# Patient Record
Sex: FEMALE | Race: WHITE | NOT HISPANIC OR LATINO
[De-identification: names, ages, dates, MRNs, and addresses within clinical notes are randomized per-mention and may not be internally consistent; named-entity substitution may affect disease eponyms.]

---

## 2018-05-17 ENCOUNTER — APPOINTMENT (OUTPATIENT)
Dept: ORTHOPEDIC SURGERY | Facility: CLINIC | Age: 48
End: 2018-05-17

## 2018-06-29 ENCOUNTER — APPOINTMENT (OUTPATIENT)
Dept: ORTHOPEDIC SURGERY | Facility: CLINIC | Age: 48
End: 2018-06-29
Payer: MEDICAID

## 2018-06-29 VITALS
HEART RATE: 93 BPM | BODY MASS INDEX: 20.41 KG/M2 | HEIGHT: 66 IN | WEIGHT: 127 LBS | DIASTOLIC BLOOD PRESSURE: 71 MMHG | SYSTOLIC BLOOD PRESSURE: 105 MMHG

## 2018-06-29 DIAGNOSIS — M22.41 CHONDROMALACIA PATELLAE, RIGHT KNEE: ICD-10-CM

## 2018-06-29 DIAGNOSIS — M25.561 PAIN IN RIGHT KNEE: ICD-10-CM

## 2018-06-29 PROCEDURE — 99213 OFFICE O/P EST LOW 20 MIN: CPT

## 2018-06-29 RX ORDER — TEMAZEPAM 30 MG/1
30 CAPSULE ORAL
Qty: 30 | Refills: 0 | Status: ACTIVE | COMMUNITY
Start: 2018-01-27

## 2018-06-29 RX ORDER — PREDNISOLONE ACETATE 10 MG/ML
1 SUSPENSION/ DROPS OPHTHALMIC
Qty: 5 | Refills: 0 | Status: ACTIVE | COMMUNITY
Start: 2018-01-31

## 2018-06-29 RX ORDER — HALOBETASOL PROPIONATE 0.5 MG/G
0.05 CREAM TOPICAL
Qty: 50 | Refills: 0 | Status: ACTIVE | COMMUNITY
Start: 2018-05-24

## 2018-06-29 RX ORDER — CIPROFLOXACIN HYDROCHLORIDE 500 MG/1
500 TABLET, FILM COATED ORAL
Qty: 10 | Refills: 0 | Status: ACTIVE | COMMUNITY
Start: 2018-06-02

## 2018-06-29 RX ORDER — MOMETASONE FUROATE 1 MG/G
0.1 CREAM TOPICAL
Qty: 45 | Refills: 0 | Status: ACTIVE | COMMUNITY
Start: 2018-05-24

## 2018-06-29 RX ORDER — VALACYCLOVIR 1 G/1
1 TABLET, FILM COATED ORAL
Qty: 8 | Refills: 0 | Status: ACTIVE | COMMUNITY
Start: 2018-04-11

## 2018-07-09 ENCOUNTER — FORM ENCOUNTER (OUTPATIENT)
Age: 48
End: 2018-07-09

## 2018-07-10 ENCOUNTER — APPOINTMENT (OUTPATIENT)
Dept: MRI IMAGING | Facility: CLINIC | Age: 48
End: 2018-07-10
Payer: MEDICAID

## 2018-07-10 ENCOUNTER — OUTPATIENT (OUTPATIENT)
Dept: OUTPATIENT SERVICES | Facility: HOSPITAL | Age: 48
LOS: 1 days | End: 2018-07-10
Payer: MEDICAID

## 2018-07-10 DIAGNOSIS — M25.561 PAIN IN RIGHT KNEE: ICD-10-CM

## 2018-07-10 PROCEDURE — 73721 MRI JNT OF LWR EXTRE W/O DYE: CPT

## 2018-07-10 PROCEDURE — 73721 MRI JNT OF LWR EXTRE W/O DYE: CPT | Mod: 26,RT

## 2018-10-18 ENCOUNTER — APPOINTMENT (OUTPATIENT)
Dept: GASTROENTEROLOGY | Facility: CLINIC | Age: 48
End: 2018-10-18
Payer: MEDICAID

## 2018-10-18 ENCOUNTER — LABORATORY RESULT (OUTPATIENT)
Age: 48
End: 2018-10-18

## 2018-10-18 VITALS
WEIGHT: 126 LBS | SYSTOLIC BLOOD PRESSURE: 110 MMHG | HEART RATE: 68 BPM | BODY MASS INDEX: 20.25 KG/M2 | HEIGHT: 66 IN | TEMPERATURE: 98.6 F | DIASTOLIC BLOOD PRESSURE: 70 MMHG | OXYGEN SATURATION: 99 %

## 2018-10-18 DIAGNOSIS — K21.9 GASTRO-ESOPHAGEAL REFLUX DISEASE W/OUT ESOPHAGITIS: ICD-10-CM

## 2018-10-18 DIAGNOSIS — R14.0 ABDOMINAL DISTENSION (GASEOUS): ICD-10-CM

## 2018-10-18 PROCEDURE — 99204 OFFICE O/P NEW MOD 45 MIN: CPT

## 2018-10-18 PROCEDURE — 83014 H PYLORI DRUG ADMIN: CPT

## 2018-10-24 DIAGNOSIS — Z86.19 PERSONAL HISTORY OF OTHER INFECTIOUS AND PARASITIC DISEASES: ICD-10-CM

## 2018-10-25 RX ORDER — LANSOPRAZOLE, AMOXICILLIN, CLARITHROMYCIN 30-500-500
KIT ORAL
Qty: 1 | Refills: 0 | Status: ACTIVE | COMMUNITY
Start: 2018-10-25 | End: 1900-01-01

## 2018-11-01 PROBLEM — Z86.19 H/O HELICOBACTER INFECTION: Status: ACTIVE | Noted: 2018-10-18

## 2018-11-01 RX ORDER — CLARITHROMYCIN 500 MG/1
500 TABLET, FILM COATED ORAL
Qty: 20 | Refills: 0 | Status: ACTIVE | COMMUNITY
Start: 2018-11-01 | End: 1900-01-01

## 2018-11-01 RX ORDER — PANTOPRAZOLE 40 MG/1
40 TABLET, DELAYED RELEASE ORAL TWICE DAILY
Qty: 20 | Refills: 2 | Status: ACTIVE | COMMUNITY
Start: 2018-11-01 | End: 1900-01-01

## 2018-11-01 RX ORDER — AMOXICILLIN 500 MG/1
500 TABLET, FILM COATED ORAL 3 TIMES DAILY
Qty: 30 | Refills: 0 | Status: ACTIVE | COMMUNITY
Start: 2018-11-01 | End: 1900-01-01

## 2019-01-11 ENCOUNTER — APPOINTMENT (OUTPATIENT)
Dept: ORTHOPEDIC SURGERY | Facility: CLINIC | Age: 49
End: 2019-01-11
Payer: MEDICAID

## 2019-01-11 VITALS
DIASTOLIC BLOOD PRESSURE: 74 MMHG | HEART RATE: 78 BPM | SYSTOLIC BLOOD PRESSURE: 112 MMHG | HEIGHT: 66 IN | WEIGHT: 127 LBS | BODY MASS INDEX: 20.41 KG/M2

## 2019-01-11 PROCEDURE — 99213 OFFICE O/P EST LOW 20 MIN: CPT

## 2019-01-11 NOTE — PHYSICAL EXAM
[LE] : Sensory: Intact in bilateral lower extremities [DP] : dorsalis pedis 2+ and symmetric bilaterally [FreeTextEntry2] : 48-year-old female. Normal gait pattern. Equal leg length. Skin intact both lower extremities. Neutral alignment of both knees.   0-120° flexion both knees. Midline patella tracking. Crepitus noted. Pain right knee with patella compression. Right knee is stable to varus/valgus and Lachman exam testing. No calf tenderness. 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 \par \par Mild degenerative undersurface fraying at the posterior body medial meniscus. \par \par 1 cm superficial cartilage loss with chondral fibrillation at the posterior \par weightbearing medial femoral condyle. \par \par Small knee joint effusion with synovitis and small multiloculated popliteal \par cyst.

## 2019-01-11 NOTE — DISCUSSION/SUMMARY
[de-identified] : Discussion had with the patient. Findings as noted above. Recommend strengthening right knee and lower extremity. Focus on quad strengthening. Activity precautions reviewed. Over-the-counter analgesics as needed. Follow up as needed.

## 2019-03-14 ENCOUNTER — APPOINTMENT (OUTPATIENT)
Dept: ORTHOPEDIC SURGERY | Facility: CLINIC | Age: 49
End: 2019-03-14
Payer: MEDICAID

## 2019-03-14 VITALS
SYSTOLIC BLOOD PRESSURE: 118 MMHG | HEART RATE: 82 BPM | HEIGHT: 66 IN | BODY MASS INDEX: 20.09 KG/M2 | DIASTOLIC BLOOD PRESSURE: 71 MMHG | WEIGHT: 125 LBS

## 2019-03-14 DIAGNOSIS — M25.561 PAIN IN RIGHT KNEE: ICD-10-CM

## 2019-03-14 PROCEDURE — 99214 OFFICE O/P EST MOD 30 MIN: CPT

## 2019-03-14 RX ORDER — LANSOPRAZOLE 30 MG/1
30 CAPSULE, DELAYED RELEASE ORAL
Qty: 28 | Refills: 0 | Status: ACTIVE | COMMUNITY
Start: 2018-10-31

## 2019-03-14 RX ORDER — AMOXICILLIN 500 MG/1
500 CAPSULE ORAL
Qty: 56 | Refills: 0 | Status: ACTIVE | COMMUNITY
Start: 2018-10-31

## 2019-03-14 RX ORDER — SULFAMETHOXAZOLE AND TRIMETHOPRIM 800; 160 MG/1; MG/1
800-160 TABLET ORAL
Qty: 10 | Refills: 0 | Status: ACTIVE | COMMUNITY
Start: 2018-10-01

## 2019-03-14 RX ORDER — MOMETASONE FUROATE 1 MG/ML
0.1 SOLUTION TOPICAL
Qty: 60 | Refills: 0 | Status: ACTIVE | COMMUNITY
Start: 2018-09-22

## 2019-03-14 RX ORDER — HYALURONATE SODIUM 20 MG/2 ML
20 SYRINGE (ML) INTRAARTICULAR
Qty: 1 | Refills: 0 | Status: ACTIVE | OUTPATIENT
Start: 2019-03-14

## 2019-03-14 RX ORDER — FLUCONAZOLE 150 MG/1
150 TABLET ORAL
Qty: 1 | Refills: 0 | Status: ACTIVE | COMMUNITY
Start: 2018-10-01

## 2019-03-14 RX ORDER — HYDROCODONE BITARTRATE AND HOMATROPINE METHYLBROMIDE 5; 1.5 MG/5ML; MG/5ML
5-1.5 SYRUP ORAL
Qty: 60 | Refills: 0 | Status: ACTIVE | COMMUNITY
Start: 2018-09-20

## 2019-03-17 NOTE — DISCUSSION/SUMMARY
[de-identified] : Patient was seen today for continued management of chronic right knee pain. She was previously evaluated by one of my surgical associates, we reviewed her MRI and prior history, based on prior history she is not have any surgical indications. She has tried several courses of conservative management including activity modification, rest, oral NSAIDs, and physical therapy. Patient has very good function, but still does have intermittent pain with ADLs, particularly pain with going upstairs. She still feels limited in her ability to exercise due to her knee pain. At this time recommend a trial of hyaluronic acid as a more long-term preventative measure to see if this will help decrease the friction on the cartilage wear in the patellofemoral compartment. She has good relief, she may resume other exercise and activities as tolerated. We'll try to obtain insurance authorization for this injection therapy and patient will follow up once we have approval.  Patient appreciates and agrees with current plan.\par \par This note was generated using dragon medical dictation software.  A reasonable effort has been made for proofreading its contents, but typos may still remain.  If there are any questions or points of clarification needed please notify my office.\par Recommendation: Trial of Viscosupplementation. Will obtain preauthorization prior to injection therapy.\par \par Followup: Once approved for injection therapy.\par \par \par **NB: Medication was Issued under circumstances where the provider (Dr. Jaime Moyer) reasonably determined that it would be impractical for the patient to obtain substances prescribed by electronic prescription (e-prescribe) given need for pre-authorization, and such delay would adversely impact the patient's medical condition. An exception letter will be mailed to the [facility] during the authorization process.**\par

## 2019-03-17 NOTE — HISTORY OF PRESENT ILLNESS
[de-identified] : Patient is here today for evaluation of one year of right knee pain. She states she had a trip and fall on pavement where she landed on the anterior knee at that time. Patient has been seen by one of my associates and has had extensive conservative management. Patient has tried activity modification, occasional use of oral NSAIDs, as well as physical therapy. Patient continues have pain with going up stairs, pain is primarily located behind the kneecap. Patient had an MRI which was previously reviewed by my associate, she has RGoyo Jonathan degradation of the medial femoral condyle, and patellofemoral arthrosis. She was determined to not be a surgical candidate and recommended to continue with conservative nonsurgical treatments. Patient is here today for evaluation and another opinion on other treatment options that may be available to her. No numbness/tingling, no radiation of pain, no current medications for this issue.

## 2019-03-17 NOTE — PHYSICAL EXAM
[de-identified] : Constitutional: Well-nourished, well-developed, No acute distress\par Respiratory:  Good respiratory effort, no SOB\par Lymphatic: No regional lymphadenopathy, no lymphedema\par Psychiatric: Pleasant and normal affect, alert and oriented x3\par Skin: Clean dry and intact B/L UE/LE\par Musculoskeletal: normal except where as noted in regional exam\par \par B/L Hips: No asymmetry, malalignment, or swelling, Full ROM, 5/5 strength in flexion/ext, IR/ER, Abd/Add, Joints stable\par B/L Ankles: No asymmetry, malalignment, or swelling, Full ROM, 5/5 strength in DF/PF/Inv/Ev, Joints stable\par \par BIOMECHANICAL EXAM: no marked leg length discrepancy, moderate hip abductor weakness b/l, no marked pes planus or foot pronation, tight hams and ITB b/l.  Normal gait and station\par \par Left Knee:\par APPEARANCE: no marked deformities, no swelling or malalignment\par POSITIVE TENDERNESS:  + crepitus of the anterior knee, and tenderness of patellar retinaculum\par NONTENDER: jt lines b/l, patellar & quadriceps tendons, MCL/LCL, ITB at the lateral femoral condyle & Gerdy's tubercle, pes bursa. \par ROM: full & painless, although some discomfort in deep knee flexion\par RESISTIVE TESTING: + discomfort with knee ext from deep knee flexion (stretched position), painless knee flexion. \par SPECIAL TESTS: stable v/v stress. painless grind. neg Lachman's. neg ant/post drawer. neg Jennifer's. neg Thessaly test. neg Darryl's & Malacrae's\par NEURO: Normal sensation of LE, DTRs 2+/4 patella and achilles\par PULSES: 2+ DP/PT pulses\par \par Right Knee:\par APPEARANCE: no marked deformities, no swelling or malalignment\par POSITIVE TENDERNESS:  + crepitus of the anterior knee, and tenderness of patellar retinaculum\par NONTENDER: jt lines b/l, patellar & quadriceps tendons, MCL/LCL, ITB at the lateral femoral condyle & Gerdy's tubercle, pes bursa. \par ROM: full & painless, although some discomfort in deep knee flexion\par RESISTIVE TESTING: + discomfort with knee ext from deep knee flexion (stretched position), painless knee flexion. \par SPECIAL TESTS: stable v/v stress. painless grind. neg Lachman's. neg ant/post drawer. neg Jennifer's. neg Thessaly test. 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 \par \par Mild degenerative undersurface fraying at the posterior body medial meniscus. \par \par 1 cm superficial cartilage loss with chondral fibrillation at the posterior \par weightbearing medial femoral condyle. \par \par Small knee joint effusion with synovitis and small multiloculated popliteal \par cyst. \par

## 2019-04-11 ENCOUNTER — APPOINTMENT (OUTPATIENT)
Dept: ORTHOPEDIC SURGERY | Facility: CLINIC | Age: 49
End: 2019-04-11
Payer: MEDICAID

## 2019-04-11 PROCEDURE — 99213 OFFICE O/P EST LOW 20 MIN: CPT

## 2019-04-11 NOTE — PHYSICAL EXAM
[de-identified] : Constitutional: Well-nourished, well-developed, No acute distress\par Respiratory:  Good respiratory effort, no SOB\par Lymphatic: No regional lymphadenopathy, no lymphedema\par Psychiatric: Pleasant and normal affect, alert and oriented x3\par Skin: Clean dry and intact B/L UE/LE\par Musculoskeletal: normal except where as noted in regional exam\par \par Right Knee:\par APPEARANCE: no marked deformities, no swelling or malalignment\par POSITIVE TENDERNESS:  + crepitus of the anterior knee, and tenderness of patellar retinaculum\par NONTENDER: jt lines b/l, patellar & quadriceps tendons, MCL/LCL, ITB at the lateral femoral condyle & Gerdy's tubercle, pes bursa. \par ROM: full & painless, although some discomfort in deep knee flexion\par RESISTIVE TESTING: + discomfort with knee ext from deep knee flexion (stretched position), painless knee flexion. \par NEURO: Normal sensation of LE, DTRs 2+/4 patella and achilles\par PULSES: 2+ DP/PT pulses\par

## 2019-04-11 NOTE — DISCUSSION/SUMMARY
[de-identified] : Patient was seen today for followup of right knee pain, we tried to obtain insurance authorization for hyaluronic acid injections, however this was denied. Patient states she does not wish to undergo cortisone injection, which I agree that she is not have severe acute pain, she would benefit more from a long-term preventative measure. Patient states that she has a  as her right knee pain is secondary to a fall. She states her  revised her to obtain a letter stating my recommendations for hyaluronic acid, and he feels that she would be able to have insurance pay for this injection therapy. If she can obtain payment and/or authorization, I am happy to administer this injection therapy as I feel is recommended to help with her patellofemoral pain. She is to continue her course of physical therapy and activity modification as previously discussed.  Patient appreciates and agrees with current plan.\par \par This note was generated using dragon medical dictation software.  A reasonable effort has been made for proofreading its contents, but typos may still remain.  If there are any questions or points of clarification needed please notify my office.

## 2019-04-11 NOTE — RETURN TO WORK/SCHOOL
[FreeTextEntry1] : Summer is under my care for right knee pain secondary to patellofemoral chondrosis/arthritis. We have discussed various treatment options and she is recommended to undergo a course of hyaluronic acid injection therapy, if this provides good relief she would be a candidate for this injection therapy every 6-12 months for long-term preventative care.  We have tried to obtain insurance authorization for this injection therapy from her primary insurance, but it was denied.\par Thank you for your understanding.\par \par Sincerely,\par \par Jaime Moyer DO, ATC\par Primary Care Sports Medicine\par [facility] [place]\par

## 2019-04-24 ENCOUNTER — APPOINTMENT (OUTPATIENT)
Dept: ORTHOPEDIC SURGERY | Facility: CLINIC | Age: 49
End: 2019-04-24
Payer: MEDICAID

## 2019-04-24 VITALS
HEART RATE: 88 BPM | BODY MASS INDEX: 20.09 KG/M2 | SYSTOLIC BLOOD PRESSURE: 99 MMHG | HEIGHT: 66 IN | DIASTOLIC BLOOD PRESSURE: 63 MMHG | WEIGHT: 125 LBS

## 2019-04-24 DIAGNOSIS — M17.11 UNILATERAL PRIMARY OSTEOARTHRITIS, RIGHT KNEE: ICD-10-CM

## 2019-04-24 PROCEDURE — 99213 OFFICE O/P EST LOW 20 MIN: CPT

## 2019-04-24 NOTE — DISCUSSION/SUMMARY
[de-identified] : Patient was seen today for continued management of right knee pain. There has been no interval change in her condition since last evaluation, if anything her condition has worsened, she is not using crutches as ambulatory assistive device. Patient is not currently doing any physical therapy, she is not performing a home exercise program at home. This is merely sitting most of the day and limping with pain when ambulating. Patient last had MRI of the right knee performed in July 2018, she is requesting a repeat MRI to see if there has been any interval change since that time. We'll try to obtain insurance authorization for this study, however she is unlikely to have any surgical indications as there've been no acute injuries since last evaluation. Patient was still benefit from physical therapy, cortisone injection, and/or hyaluronic acid injection therapy to alleviate her chronic knee pain. Patient will follow up on MRI results are available.  Patient appreciates and agrees with current plan.\par \par This note was generated using dragon medical dictation software.  A reasonable effort has been made for proofreading its contents, but typos may still remain.  If there are any questions or points of clarification needed please notify my office.

## 2019-04-24 NOTE — HISTORY OF PRESENT ILLNESS
[de-identified] : Patient is here for right knee pain follow up. She states that since her last evaluation her pain has increased without further injury. She has been wearing a knee compression sleeve and ambulating with crutches. She contacted her  in terms of gaining approval for the gel injections but she has not heard back at this time. She is requesting a follow up MRI.

## 2019-04-24 NOTE — PHYSICAL EXAM
[de-identified] : Constitutional: Well-nourished, well-developed, No acute distress\par Respiratory:  Good respiratory effort, no SOB\par Lymphatic: No regional lymphadenopathy, no lymphedema\par Psychiatric: Pleasant and normal affect, alert and oriented x3\par Skin: Clean dry and intact B/L UE/LE\par Musculoskeletal: normal except where as noted in regional exam\par \par Right Knee:\par APPEARANCE: 1+ swelling, no marked deformities or malalignment\par POSITIVE TENDERNESS:  + crepitus of the anterior knee, and tenderness of patellar retinaculum\par NONTENDER: jt lines b/l, patellar & quadriceps tendons, MCL/LCL, ITB at the lateral femoral condyle & Gerdy's tubercle, pes bursa. \par ROM: full & painless, although some discomfort in deep knee flexion\par RESISTIVE TESTING: + discomfort with knee ext from deep knee flexion (stretched position), painless knee flexion. \par NEURO: Normal sensation of LE, DTRs 2+/4 patella and achilles\par PULSES: 2+ DP/PT pulses\par

## 2019-04-24 NOTE — RETURN TO WORK/SCHOOL
[FreeTextEntry1] : Summer is under my care for right knee osteoarthritis, she is currently having a significant exacerbation of her pain and utilizing crutches for ambulation. She has pain with any prolonged standing or sitting. 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

## 2020-08-20 ENCOUNTER — APPOINTMENT (OUTPATIENT)
Dept: ORTHOPEDIC SURGERY | Facility: CLINIC | Age: 50
End: 2020-08-20
Payer: MEDICAID

## 2020-08-20 VITALS — TEMPERATURE: 97.9 F

## 2020-08-20 DIAGNOSIS — M18.12 UNILATERAL PRIMARY OSTEOARTHRITIS OF FIRST CARPOMETACARPAL JOINT, LEFT HAND: ICD-10-CM

## 2020-08-20 PROCEDURE — 99214 OFFICE O/P EST MOD 30 MIN: CPT

## 2020-08-20 NOTE — HISTORY OF PRESENT ILLNESS
[de-identified] : Pt is a 51 yo F here for eval of Left thumb pain which started when she had a fall in January 2018.  Patient had no evaluation at that time, there is been no evaluation or treatment for this thumb pain since that time.  Patient has noticed increased pain over the last 2-3 months, she also notes weakness in the thumb, denies numbness/tingling.  T She has used topical medications with no help.  Not taking pain meds.  She has not used a brace.

## 2020-08-20 NOTE — PHYSICAL EXAM
[de-identified] : Constitutional: Well-nourished, well-developed, No acute distress\par Respiratory:  Good respiratory effort, no SOB\par Lymphatic: No regional lymphadenopathy, no lymphedema\par Psychiatric: Pleasant and normal affect, alert and oriented x3\par Skin: Clean dry and intact B/L UE/LE\par Musculoskeletal: normal except where as noted in regional exam\par \par \par Right Hand:\par APPEARANCE: no marked deformities, no swelling or malalignment\par POSITIVE TENDERNESS: none\par NONTENDER: osseous and ligamentous structures. \par ROM: full & painless in CMC, MCP, and IP joints. \par RESISTIVE TESTING: painless resisted testing. \par SPECIAL TESTS: normal sensation of 1st through 5th digits, normal flex/ext, abd/add, and opposition of thumb, no triggering of fingers\par Vasc: 2+ radial pulse, normal capillary refill\par Neuro: AIN, PIN, Ulnar nerve intact to motor\par Sensation: Intact to light touch throughout\par B/L Shoulders:  No asymmetry, malalignment, or swelling, Full ROM, 5/5 strength in flexion/ext, Abd/Add, IR/ER, Joints stable\par B/L Elbows:  No asymmetry, malalignment, or swelling, Full ROM, 5/5 strength in flex/ext, pronation/supination, Joints stable\par B/L wrists: No asymmetry, malalignment, or swelling, Full ROM, 5/5 strength in wrist flexion/ext, and radial/ulnar deviation, Joints stable\par \par Left Hand:\par APPEARANCE: No swelling, no marked deformities or malalignment of the fingers\par POSITIVE TENDERNESS: + 1st CMC joint\par NONTENDER: all other osseous and ligamentous structures. \par ROM: + Crepitus and mild limitation of the first CMC joint, otherwise full & painless in CMC, MCP, and IP joints. \par RESISTIVE TESTING: painless resisted testing. \par SPECIAL TESTS: normal sensation of 1st through 5th digits, normal flex/ext, abd/add, and opposition of thumb, no triggering of fingers\par Vasc: 2+ radial pulse, normal capillary refill\par Neuro: AIN, PIN, Ulnar nerve intact to motor\par Sensation: Intact to light touch throughout\par \par \par

## 2020-08-20 NOTE — DISCUSSION/SUMMARY
[de-identified] : Patient was seen today for evaluation and management of 2-1/2 years of left thumb pain status post fall.  Patient has had no work-up or treatment for this issue since that time.  On clinical exam patient has no significant functional limitations, she has localized tenderness of the first CMC joint.  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  Patient will be started on a course of hand therapy at this time to restore normal range of motion, strength and overall function.  Patient advised to  over-the-counter paraffin wax bath to be used daily in the mornings to help address this chronic pain.  Patient may continue use topical oil as it is providing good relief.  Follow up as needed.  Patient appreciates and agrees with current plan.\par \par This note was generated using dragon medical dictation software.  A reasonable effort has been made for proofreading its contents, but typos may still remain.  If there are any questions or points of clarification needed please notify my office.

## 2023-10-28 NOTE — REVIEW OF SYSTEMS
Attempted to reach pt to relay recommendations over the phone.   Left a message for return call.     Will also send patient a message over [facility] recommending ER for labs and IV fluids/looking for preferred pharmacy for pended scripts.   Dr Denis Mooney please see pt's msg and advise. [Joint Pain] : joint pain [Negative] : Heme/Lymph